# Patient Record
Sex: FEMALE | Race: WHITE
[De-identification: names, ages, dates, MRNs, and addresses within clinical notes are randomized per-mention and may not be internally consistent; named-entity substitution may affect disease eponyms.]

---

## 2018-03-21 ENCOUNTER — HOSPITAL ENCOUNTER (EMERGENCY)
Dept: HOSPITAL 97 - ER | Age: 23
Discharge: HOME | End: 2018-03-21
Payer: COMMERCIAL

## 2018-03-21 DIAGNOSIS — Z45.2: Primary | ICD-10-CM

## 2018-03-21 LAB
METHAMPHET UR QL SCN: NEGATIVE
THC SERPL-MCNC: NEGATIVE NG/ML
UA COMPLETE W REFLEX CULTURE PNL UR: (no result)

## 2018-03-21 PROCEDURE — 81025 URINE PREGNANCY TEST: CPT

## 2018-03-21 PROCEDURE — 87088 URINE BACTERIA CULTURE: CPT

## 2018-03-21 PROCEDURE — 80307 DRUG TEST PRSMV CHEM ANLYZR: CPT

## 2018-03-21 PROCEDURE — 81015 MICROSCOPIC EXAM OF URINE: CPT

## 2018-03-21 PROCEDURE — 81003 URINALYSIS AUTO W/O SCOPE: CPT

## 2018-03-21 PROCEDURE — 99281 EMR DPT VST MAYX REQ PHY/QHP: CPT

## 2018-03-21 PROCEDURE — 71045 X-RAY EXAM CHEST 1 VIEW: CPT

## 2018-03-21 PROCEDURE — 87086 URINE CULTURE/COLONY COUNT: CPT

## 2018-03-21 PROCEDURE — 93005 ELECTROCARDIOGRAM TRACING: CPT

## 2018-03-21 NOTE — ER
Nurse's Notes                                                                                     

 [facility]                                                                

Name: Kari Delvalle                                                                               

Age: 22 yrs                                                                                       

Sex: Female                                                                                       

: 1995                                                                                   

MRN: [id number]                                                                                   

Arrival Date: 2018                                                                          

Time: 21:17                                                                                       

Account#: [id number]                                                                            

Bed 20                                                                                            

Private MD:                                                                                       

Diagnosis: Encounter to check PICC placement;Palpitations                                         

                                                                                                  

Presentation:                                                                                     

                                                                                             

21:27 Presenting complaint: Patient states: she had a PICC line placed today for IV abx       aa1 

      therapy and reports when they initially placed the line they inserted it too far and        

      had to back it out and ever since she has been having palpitations and intermittently       

      feels like something is hitting her in the chest. NAD noted. Transition of care:            

      patient was not received from another setting of care. Onset of symptoms was 2018. Care prior to arrival: None.                                                          

21:27 Method Of Arrival: Ambulatory                                                           aa1 

21:27 Acuity: MELVIN 3                                                                           aa1 

                                                                                                  

Triage Assessment:                                                                                

21:30 General: Appears in no apparent distress. comfortable, Behavior is calm, cooperative,   aa1 

      appropriate for age. Pain: Denies pain.                                                     

                                                                                                  

OB/GYN:                                                                                           

21:30 LMP N/A - Birth control method                                                          aa1 

                                                                                                  

Historical:                                                                                       

- Allergies:                                                                                      

21:30 Keflex;                                                                                 aa1 

- Home Meds:                                                                                      

21:30 meropenem 1 gram intravenous solr twice a day [Active];                                 aa1 

- PMHx:                                                                                           

21:30 chronic UTI;                                                                            aa1 

- PSHx:                                                                                           

21:30 None;                                                                                   aa1 

                                                                                                  

- Immunization history:: Flu vaccine is up to date.                                               

- Social history:: Smoking status: Patient/guardian denies using tobacco.                         

                                                                                                  

                                                                                                  

Screenin:45 Abuse screen: Denies threats or abuse. Nutritional screening: No deficits noted.        jd3 

      Tuberculosis screening: No symptoms or risk factors identified. Fall Risk IV access (20     

      points). Total Chatman Fall Scale indicates No Risk (0-24 pts).                               

                                                                                                  

Assessment:                                                                                       

21:43 General: Appears in no apparent distress. uncomfortable, Behavior is calm, cooperative, jd3 

      appropriate for age, Reports chest pressure. Pain: Denies pain. Neuro: Level of             

      Consciousness is awake, alert, obeys commands, Oriented to person, place, time,             

      situation. Cardiovascular: Heart tones S1 S2 present Capillary refill < 3 seconds           

      Patient's skin is warm and dry. Respiratory: Airway is patent Respiratory effort is         

      even, unlabored, Respiratory pattern is regular, symmetrical, Breath sounds are clear       

      bilaterally. GI: Abdomen is round Patient currently denies diarrhea, nausea, vomiting.      

      : No signs and/or symptoms were reported regarding the genitourinary system. EENT: No     

      signs and/or symptoms were reported regarding the EENT system. Derm: Skin is intact,        

      Skin is dry, Skin is normal, Skin temperature is warm. Musculoskeletal: Circulation,        

      motion, and sensation intact. Range of motion: intact in all extremities.                   

22:38 Reassessment: Patient appears in no apparent distress at this time. Patient and/or      jd3 

      family updated on plan of care and expected duration. Pain level reassessed. Patient is     

      alert, oriented x 3, equal unlabored respirations, skin warm/dry/pink.                      

23:31 Reassessment: Patient appears in no apparent distress at this time. Patient and/or      jd3 

      family updated on plan of care and expected duration. Pain level reassessed. Patient is     

      alert, oriented x 3, equal unlabored respirations, skin warm/dry/pink. pt and family        

      reported understanding of discharge instructions and fallow-up care.                        

                                                                                                  

Vital Signs:                                                                                      

21:30  / 82; Pulse 81; Resp 16; Temp 97.8; Pulse Ox 100% on R/A; Weight 79.38 kg;       aa1 

      Height 5 ft. 4 in. (162.56 cm); Pain 0/10;                                                  

22:31  / 63; Pulse 68; Resp 14; Pulse Ox 99% on R/A;                                    mt  

23:27  / 74; Pulse 70; Resp 16; Pulse Ox 98% on R/A;                                    mt  

21:30 Body Mass Index 30.04 (79.38 kg, 162.56 cm)                                             aa1 

                                                                                                  

ED Course:                                                                                        

21:17 Patient arrived in ED.                                                                  am2 

21:28 Triage completed.                                                                       aa1 

21:30 Arm band placed on right wrist.                                                         aa1 

21:32 Néstor Hirsch RN is Primary Nurse.                                                  jd3 

21:34 Winnie Brandon FNP-C is PHCP.                                                        snw 

21:34 Steve Sandoval MD is Attending Physician.                                             snw 

21:46 Patient has correct armband on for positive identification. Placed in gown. Bed in low  jd3 

      position. Call light in reach. Side rails up X2. Adult w/ patient.                          

23:31 No provider procedures requiring assistance completed. Patient did not have IV access   jd3 

      during this emergency room visit.                                                           

                                                                                                  

Administered Medications:                                                                         

No medications were administered                                                                  

                                                                                                  

                                                                                                  

Outcome:                                                                                          

23:12 Discharge ordered by MD.                                                                navdeep 

23:31 Discharged to home ambulatory, with family.                                             gabriela 

23:31 Condition: stable                                                                           

23:31 Discharge instructions given to patient, family, Instructed on discharge instructions,      

      follow up and referral plans. Demonstrated understanding of instructions, follow-up         

      care.                                                                                       

23:32 Patient left the ED.                                                                    gabriela 

                                                                                                  

Signatures:                                                                                       

Anna Patten RN                        RN   aa1                                                  

Winnie Brandon, RILEYP-C                 FNP-Melodiew                                                  

Isabelle Bowen Moriah mt Davies, Jonathon, RN                    RN   jd3                                                  

                                                                                                  

**************************************************************************************************

## 2018-03-21 NOTE — RAD REPORT
EXAM DESCRIPTION:  RAD - Chest Single View - 3/21/2018 9:54 pm

 

CLINICAL HISTORY:  Chest pain. Recent PICC line placement.

 

COMPARISON:  03/21/2018

 

FINDINGS:  Portable technique limits examination quality.

 

The lungs are grossly clear. The heart is normal in size. No displaced fractures.The left-sided PICC 
line appears to be in the region of the distal SVC, slightly retracted relative to the prior position
.

 

IMPRESSION:  No acute intrathoracic process suspected.

## 2018-03-21 NOTE — EDPHYS
Physician Documentation                                                                           

 [facility]                                                                

Name: Kari Delvalle                                                                               

Age: 22 yrs                                                                                       

Sex: Female                                                                                       

: 1995                                                                                   

MRN: [id number]                                                                                   

Arrival Date: 2018                                                                          

Time: 21:17                                                                                       

Account#: [id number]                                                                            

Bed 20                                                                                            

Private MD:                                                                                       

ED Physician Steve Sandoval                                                                      

HPI:                                                                                              

                                                                                             

21:44 This 22 yrs old  Female presents to ER via Ambulatory with complaints of       snw 

      Palpitations.                                                                               

21:44 The patient presents with a history of irregular heart beat. Context: The symptoms      snw 

      occur pt had PICC line placed today for long term abx - Merrem for chronic UTI. Dr. Giordano is managing. Pt states PICC was too long and had to be pulled back. C/o               

      palpitations since.                                                                         

                                                                                                  

OB/GYN:                                                                                           

21:30 LMP N/A - Birth control method                                                          aa1 

                                                                                                  

Historical:                                                                                       

- Allergies:                                                                                      

21:30 Keflex;                                                                                 aa1 

- Home Meds:                                                                                      

21:30 meropenem 1 gram intravenous solr twice a day [Active];                                 aa1 

- PMHx:                                                                                           

21:30 chronic UTI;                                                                            aa1 

- PSHx:                                                                                           

21:30 None;                                                                                   aa1 

                                                                                                  

- Immunization history:: Flu vaccine is up to date.                                               

- Social history:: Smoking status: Patient/guardian denies using tobacco.                         

                                                                                                  

                                                                                                  

ROS:                                                                                              

21:42 Constitutional: Negative for fever, chills, and weight loss, Eyes: Negative for injury, snw 

      pain, redness, and discharge, ENT: Negative for injury, pain, and discharge, Neck:          

      Negative for injury, pain, and swelling, Cardiovascular: Negative for chest pain and        

      edema, +palpitations Respiratory: Negative for shortness of breath, cough, wheezing,        

      and pleuritic chest pain, Abdomen/GI: Negative for abdominal pain, nausea, vomiting,        

      diarrhea, and constipation, Back: Negative for injury and pain, : Negative for            

      injury, bleeding, discharge, and swelling, MS/Extremity: Negative for injury and            

      deformity, Skin: Negative for injury, rash, and discoloration, Neuro: Negative for          

      headache, weakness, numbness, tingling, and seizure, Psych: Negative for depression,        

      anxiety, suicide ideation, homicidal ideation, and hallucinations.                          

                                                                                                  

Exam:                                                                                             

21:42 Constitutional:  This is a well developed, well nourished patient who is awake, alert,  snw 

      and in no acute distress. Head/Face:  Normocephalic, atraumatic. Eyes:  Pupils equal        

      round and reactive to light, extra-ocular motions intact.  Lids and lashes normal.          

      Conjunctiva and sclera are non-icteric and not injected.  Cornea within normal limits.      

      Periorbital areas with no swelling, redness, or edema. ENT:  Nares patent. No nasal         

      discharge, no septal abnormalities noted.  Tympanic membranes are normal and external       

      auditory canals are clear.  Oropharynx with no redness, swelling, or masses, exudates,      

      or evidence of obstruction, uvula midline.  Mucous membranes moist. Neck:  Trachea          

      midline, no thyromegaly or masses palpated, and no cervical lymphadenopathy.  Supple,       

      full range of motion without nuchal rigidity, or vertebral point tenderness.  No            

      Meningismus. Chest/axilla:  Normal chest wall appearance and motion.  Nontender with no     

      deformity.  No lesions are appreciated. Cardiovascular:  Regular rate and rhythm with a     

      normal S1 and S2.  No gallops, murmurs, or rubs.  Normal PMI, no JVD.  No pulse             

      deficits.  Respiratory:  Lungs have equal breath sounds bilaterally, clear to               

      auscultation and percussion.  No rales, rhonchi or wheezes noted.  No increased work of     

      breathing, no retractions or nasal flaring. Abdomen/GI:  Soft, non-tender, with normal      

      bowel sounds.  No distension or tympany.  No guarding or rebound.  No evidence of           

      tenderness throughout. Back:  No spinal tenderness.  No costovertebral tenderness.          

      Full range of motion. Skin:  Warm, dry with normal turgor.  Normal color with no            

      rashes, no lesions, and no evidence of cellulitis. MS/ Extremity:  Pulses equal, no         

      cyanosis.  Neurovascular intact.  Full, normal range of motion. Neuro:  Awake and           

      alert, GCS 15, oriented to person, place, time, and situation.  Cranial nerves II-XII       

      grossly intact.  Motor strength 5/5 in all extremities.  Sensory grossly intact.            

      Cerebellar exam normal.  Normal gait. Psych:  Awake, alert, with orientation to person,     

      place and time.  Behavior, mood, and affect are within normal limits.                       

                                                                                                  

Vital Signs:                                                                                      

21:30  / 82; Pulse 81; Resp 16; Temp 97.8; Pulse Ox 100% on R/A; Weight 79.38 kg;       aa1 

      Height 5 ft. 4 in. (162.56 cm); Pain 0/10;                                                  

22:31  / 63; Pulse 68; Resp 14; Pulse Ox 99% on R/A;                                    mt  

23:27  / 74; Pulse 70; Resp 16; Pulse Ox 98% on R/A;                                    mt  

21:30 Body Mass Index 30.04 (79.38 kg, 162.56 cm)                                             aa1 

                                                                                                  

MDM:                                                                                              

21:34 Patient medically screened.                                                             snw 

                                                                                             

00:11 Data reviewed: vital signs, nurses notes. Data interpreted: Pulse oximetry: on room air snw 

      is 98 %. Interpretation: normal. Counseling: I had a detailed discussion with the           

      patient and/or guardian regarding: the historical points, exam findings, and any            

      diagnostic results supporting the discharge/admit diagnosis, radiology results, the         

      need for outpatient follow up, to return to the emergency department if symptoms worsen     

      or persist or if there are any questions or concerns that arise at home. Special            

      discussion: Based on the history and exam findings, there is no indication for further      

      emergent testing or inpatient evaluation. I discussed with the patient/guardian the         

      need to see the primary care provider for further evaluation of the symptoms.               

                                                                                                  

                                                                                             

21:27 Order name: Urine Drug Screen                                                           [facility] 

                                                                                             

21:27 Order name: Urine Microscopic Only                                                      [facility] 

                                                                                             

21:35 Order name: Chest Single View XRAY                                                      [facility] 

                                                                                             

22:40 Order name: Urine Dipstick--Ancillary (enter results)                                   [facility] 

                                                                                             

22:40 Order name: Urine Pregnancy--Ancillary (enter results)                                  [facility] 

                                                                                             

22:53 Order name: Urine Drug Screen; Complete Time: 23:03                                     [facility]

                                                                                             

21:27 Order name: EKG; Complete Time: 21:28                                                   [facility] 

                                                                                             

21:27 Order name: EKG - Nurse/Tech; Complete Time: 21:49                                      snw 

                                                                                             

21:27 Order name: Urine Pregnancy Test (obtain specimen); Complete Time: 22:38                snw 

                                                                                             

21:27 Order name: Urine Dipstick-Ancillary (obtain specimen); Complete Time: 22:38            [facility] 

                                                                                             

22:04 Order name: RAD; Complete Time: 22:06                                                   [facility]

                                                                                                  

Administered Medications:                                                                         

No medications were administered                                                                  

                                                                                                  

                                                                                                  

Disposition:                                                                                      

07:08 Co-signature as Attending Physician, Steve Sandoval MD I agree with the assessment and  moisés 

      plan of care.                                                                               

                                                                                                  

Disposition:                                                                                      

18 23:12 Discharged to Home. Impression: Encounter to check PICC placement, Palpitations.   

- Condition is Stable.                                                                            

- Discharge Instructions: Palpitations, PICC Home Guide.                                          

                                                                                                  

- Medication Reconciliation Form, Thank You Letter, Antibiotic Education, Prescription            

  Opioid Use form.                                                                                

- Follow up: Private Physician; When: 1 - 2 days; Reason: Recheck today's complaints,             

  Continuance of care, Re-evaluation by your physician. Follow up: Emergency                      

  Department; When: As needed; Reason: Worsening of condition.                                    

                                                                                                  

                                                                                                  

                                                                                                  

Signatures:                                                                                       

Dispatcher [employer]                           Anna Graff, RN                        RN   aa1                                                  

Steve Sandoval MD MD cha Therrien, Shelly, FNP-C                 FNP-Csnw                                                  

Néstor Hirsch RN                    RN   jd3                                                  

                                                                                                  

**************************************************************************************************

## 2018-03-22 NOTE — EKG
Test Date:    2018-03-21               Test Time:    21:42:30

Technician:   MT                                     

                                                     

MEASUREMENT RESULTS:                                       

Intervals:                                           

Rate:         73                                     

MA:           144                                    

QRSD:         84                                     

QT:           392                                    

QTc:          431                                    

Axis:                                                

P:            61                                     

MA:           144                                    

QRS:          76                                     

T:            70                                     

                                                     

INTERPRETIVE STATEMENTS:                                       

                                                     

Normal sinus rhythm

Normal ECG

No previous ECG available for comparison



Electronically Signed On 03-22-18 07:25:05 CDT by Des Aguilar